# Patient Record
Sex: MALE | Race: WHITE | NOT HISPANIC OR LATINO | URBAN - METROPOLITAN AREA
[De-identification: names, ages, dates, MRNs, and addresses within clinical notes are randomized per-mention and may not be internally consistent; named-entity substitution may affect disease eponyms.]

---

## 2020-03-04 ENCOUNTER — EMERGENCY (EMERGENCY)
Facility: HOSPITAL | Age: 30
LOS: 0 days | Discharge: HOME | End: 2020-03-04
Admitting: EMERGENCY MEDICINE
Payer: MEDICAID

## 2020-03-04 VITALS
HEART RATE: 77 BPM | TEMPERATURE: 98 F | SYSTOLIC BLOOD PRESSURE: 136 MMHG | OXYGEN SATURATION: 98 % | RESPIRATION RATE: 17 BRPM | DIASTOLIC BLOOD PRESSURE: 66 MMHG
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
## 2020-03-04 DIAGNOSIS — Z23 ENCOUNTER FOR IMMUNIZATION: ICD-10-CM
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
## 2020-03-04 DIAGNOSIS — S51.811A LACERATION WITHOUT FOREIGN BODY OF RIGHT FOREARM, INITIAL ENCOUNTER: ICD-10-CM

## 2020-03-04 PROCEDURE — 99283 EMERGENCY DEPT VISIT LOW MDM: CPT | Mod: 25

## 2020-03-04 PROCEDURE — 12002 RPR S/N/AX/GEN/TRNK2.6-7.5CM: CPT

## 2020-03-04 RX ORDER — TETANUS TOXOID, REDUCED DIPHTHERIA TOXOID AND ACELLULAR PERTUSSIS VACCINE, ADSORBED 5; 2.5; 8; 8; 2.5 [IU]/.5ML; [IU]/.5ML; UG/.5ML; UG/.5ML; UG/.5ML
0.5 SUSPENSION INTRAMUSCULAR ONCE
Refills: 0 | Status: COMPLETED | OUTPATIENT
Start: 2020-03-04 | End: 2020-03-04

## 2020-03-04 RX ADMIN — TETANUS TOXOID, REDUCED DIPHTHERIA TOXOID AND ACELLULAR PERTUSSIS VACCINE, ADSORBED 0.5 MILLILITER(S): 5; 2.5; 8; 8; 2.5 SUSPENSION INTRAMUSCULAR at 23:33

## 2020-03-04 NOTE — ED PROVIDER NOTE - PATIENT PORTAL LINK FT
You can access the FollowMyHealth Patient Portal offered by [facility] by registering at the following website: [email or web address]. By joining [facility]’s FollowMyHealth portal, you will also be able to view your health information using other applications (apps) compatible with our system.

## 2020-03-04 NOTE — ED PROVIDER NOTE - PHYSICAL EXAMINATION
3cm lac to right forearm, linear superficial CONSTITUTIONAL: Well-appearing; well-nourished; in no apparent distress.   MS:  Normal ROM in all four extremities; non-tender to palpation; distal pulses are normal.   SKIN: 3cm lac to right forearm, linear superficial; otherwise normal for age and race; warm; dry; good turgor; no apparent lesions or exudate.   NEURO/PSYCH: A & O x 4; grossly unremarkable. mood and manner are appropriate. Grooming and personal hygiene are appropriate. No apparent thoughts of harm to self or others.

## 2020-03-04 NOTE — ED PROVIDER NOTE - PROGRESS NOTE DETAILS
Discussed rest, ice, compression, and elevation with patient.   Discussed NSAIDs for anti-inflammatory and pain relief.

## 2020-03-04 NOTE — ED PROVIDER NOTE - NSFOLLOWUPINSTRUCTIONS_ED_ALL_ED_FT
RETURN FOR SUTURE REMOVAL IN 10-14 DAYS  KEEP DRESSING ON AND WOUND CLEAN&DRY FOR 24 HRS.  AFTER THAT, GENTLY REMOVE DRESSING. WASH WITH SOAP&WATER, AVOID SCRUBBING. PAT DRY WELL AND RE-DRESS. REPEAT THIS ONCE OR TWICE DAILY UNTIL THE SUTURES ARE REMOVED.  RETURN IMMEDIATELY IF ANY SIGNS/SYMPTOMS OF INFECTION; INCLUDING BUT NOT LIMITED TO: DISCHARGE, FOUL SMELL, REDNESS, SWELLING, FEVER...    Laceration    WHAT YOU NEED TO KNOW:    A laceration is an injury to the skin and the soft tissue underneath it. Lacerations happen when you are cut or hit by something. They can happen anywhere on the body.     DISCHARGE INSTRUCTIONS:    Return to the emergency department if:     You have heavy bleeding or bleeding that does not stop after 10 minutes of holding firm, direct pressure over the wound.       Your wound opens up.     Contact your healthcare provider if:     You have a fever or chills.       Your laceration is red, warm, or swollen.      You have red streaks on your skin coming from your wound.      You have white or yellow drainage from the wound that smells bad.      You have pain that gets worse, even after treatment.       You have questions or concerns about your condition or care.     Medicines:     Prescription pain medicine may be given. Ask how to take this medicine safely.       Antibiotics help treat or prevent a bacterial infection.       Take your medicine as directed. Contact your healthcare provider if you think your medicine is not helping or if you have side effects. Tell him or her if you are allergic to any medicine. Keep a list of the medicines, vitamins, and herbs you take. Include the amounts, and when and why you take them. Bring the list or the pill bottles to follow-up visits. Carry your medicine list with you in case of an emergency.    Care for your wound as directed:     Do not get your wound wet until your healthcare provider says it is okay. Do not soak your wound in water. Do not go swimming until your healthcare provider says it is okay. Carefully wash the wound with soap and water. Gently pat the area dry or allow it to air dry.       Change your bandages when they get wet, dirty, or after washing. Apply new, clean bandages as directed. Do not apply elastic bandages or tape too tight. Do not put powders or lotions over your incision.       Apply antibiotic ointment as directed. Your healthcare provider may give you antibiotic ointment to put over your wound if you have stitches. If you have strips of tape over your incision, let them dry up and fall off on their own. If they do not fall off within 14 days, gently remove them. If you have glue over your wound, do not remove or pick at it. If your glue comes off, do not replace it with glue that you have at home.       Check your wound every day for signs of infection such as swelling, redness, or pus.     Self-care:     Apply ice on your wound for 15 to 20 minutes every hour or as directed. Use an ice pack, or put crushed ice in a plastic bag. Cover it with a towel. Ice helps prevent tissue damage and decreases swelling and pain.      Use a splint as directed. A splint will decrease movement and stress on your wound. It may help it heal faster. A splint may be used for lacerations over joints or areas of your body that bend. Ask your healthcare provider how to apply and remove a splint.       Decrease scarring of your wound by applying ointments as directed. Do not apply ointments until your healthcare provider says it is okay. You may need to wait until your wound is healed. Ask which ointment to buy and how often to use it. After your wound is healed, use sunscreen over the area when you are out in the sun. You should do this for at least 6 months to 1 year after your injury.     Follow up with your healthcare provider as directed: You may need to follow up in 24 to 48 hours to have your wound checked for infection. You will need to return in 3 to 14 days if you have stitches or staples so they can be removed. Care for your wound as directed to prevent infection and help it heal. Write down your questions so you remember to ask them during your visits.       © Copyright [employer] 2019 All illustrations and images included in CareNotes are the copyrighted property of [employer] or [employer].

## 2020-03-04 NOTE — ED PROVIDER NOTE - PMH
Problem: Patient Care Overview (Adult)  Goal: Plan of Care Review  Outcome: Ongoing (interventions implemented as appropriate)   02/05/18 1222   Coping/Psychosocial Response Interventions   Plan Of Care Reviewed With patient   Patient Care Overview   Progress improving   Outcome Evaluation   Outcome Summary/Follow up Plan pt anxious wants to go home.only needs CG assist for gait.ambulat 250 ft today with only 1 standing rest       Problem: Inpatient Physical Therapy  Goal: Bed Mobility Goal LTG- PT  Outcome: Outcome(s) achieved Date Met: 02/05/18 01/29/18 1420 02/04/18 1530 02/05/18 1222   Bed Mobility PT LTG   Bed Mobility PT LTG, Date Established 01/29/18 --  --    Bed Mobility PT LTG, Time to Achieve 2 wks --  --    Bed Mobility PT LTG, Activity Type supine to sit/sit to supine --  --    Bed Mobility PT LTG, [place] Level independent --  --    Bed Mobility PT LTG, Date Goal Reviewed --  02/04/18 --    Bed Mobility PT LTG, Outcome --  --  goal met     Goal: Transfer Training Goal 1 LTG- PT  Outcome: Ongoing (interventions implemented as appropriate)   01/29/18 1420 02/04/18 1530 02/05/18 1222   Transfer Training PT LTG   Transfer Training PT LTG, Date Established 01/29/18 --  --    Transfer Training PT LTG, Time to Achieve 2 wks --  --    Transfer Training PT LTG, Activity Type sit to stand/stand to sit --  --    Transfer Training PT LTG, [place] Level conditional independence --  --    Transfer Training PT LTG, Assist Device walker, rolling --  --    Transfer Training PT LTG, Date Goal Reviewed --  02/04/18 --    Transfer Training PT LTG, Outcome --  --  goal ongoing          No pertinent past medical history <<----- Click to add NO pertinent Past Medical History

## 2020-03-04 NOTE — ED PROVIDER NOTE - OBJECTIVE STATEMENT
was playing with a metal sticked mop and it accidentally hit his right forearm causing lac, tdap not utd pt was playing with a metal sticked mop and it accidentally hit his right forearm causing lac, tdap not utd. Denies fever/chill/HA/dizziness/chest pain/palpitation/sob/abd pain/n/v/d/ black stool/bloody stool/urinary sxs

## 2020-06-03 ENCOUNTER — EMERGENCY (EMERGENCY)
Facility: HOSPITAL | Age: 30
LOS: 0 days | Discharge: HOME | End: 2020-06-04
Attending: EMERGENCY MEDICINE | Admitting: EMERGENCY MEDICINE
Payer: MEDICAID

## 2020-06-03 VITALS
OXYGEN SATURATION: 96 % | DIASTOLIC BLOOD PRESSURE: 71 MMHG | HEART RATE: 96 BPM | SYSTOLIC BLOOD PRESSURE: 151 MMHG | WEIGHT: 279.99 LBS | RESPIRATION RATE: 16 BRPM | TEMPERATURE: 98 F
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
## 2020-06-03 DIAGNOSIS — M79.646 PAIN IN UNSPECIFIED FINGER(S): ICD-10-CM
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
## 2020-06-03 DIAGNOSIS — S63.101A UNSPECIFIED SUBLUXATION OF RIGHT THUMB, INITIAL ENCOUNTER: ICD-10-CM

## 2020-06-03 PROCEDURE — 99284 EMERGENCY DEPT VISIT MOD MDM: CPT | Mod: 57

## 2020-06-03 PROCEDURE — 73130 X-RAY EXAM OF HAND: CPT | Mod: 26,RT,76

## 2020-06-03 PROCEDURE — 26770 TREAT FINGER DISLOCATION: CPT | Mod: 54

## 2020-06-03 NOTE — ED PROVIDER NOTE - NS ED ROS FT
GEN:  no fever, no chills  NEURO:  no headache, no dizziness  CV:  no chest pain, no palpitations  RESP:  no sob, no cough  MSK:  + thumb pain, no edema  SKIN:  no rash, no bruising

## 2020-06-03 NOTE — ED PROVIDER NOTE - OBJECTIVE STATEMENT
29 yo M with no PMHx who presents with R thumb pain/deformity after accidentally hitting it against a dry wall 1 hr prior to arrival. No numbness, tingling, skin color change, other injury. Has dislocated R thumb in past before from playing baseball. R hand dominant.

## 2020-06-03 NOTE — ED PROVIDER NOTE - ATTENDING CONTRIBUTION TO CARE
30yoM previously healthy presents with R thumb deformity s/p running into drywall. Denies numbness and all other symptoms. On exam, afebrile, hemodynamically stable, saturating well, NAD, well appearing, head NCAT, breathing comfortably on RA, AAO, CN's 3-12 grossly intact, R thumb hyperextended/fixed, skin warm, nml color, well perfused, <2 sec cap refill. Thumb reduced successful. Full ROM and perfusion. Patient is well appearing, NAD, afebrile, hemodynamically stable. Any available tests and studies were discussed with patient. Discharged with instructions in further symptomatic care and need for ortho f/u. 30yoM previously healthy presents with R thumb deformity s/p running into drywall. Denies numbness and all other symptoms. Has had multiple past dislocations that he self-reduced. On exam, afebrile, hemodynamically stable, saturating well, NAD, well appearing, head NCAT, breathing comfortably on RA, AAO, CN's 3-12 grossly intact, R thumb hyperextended/fixed, skin warm, nml color, well perfused, <2 sec cap refill. Thumb reduced successful. Full ROM and perfusion. Patient is well appearing, NAD, afebrile, hemodynamically stable. Any available tests and studies were discussed with patient. Discharged with instructions in further symptomatic care and need for ortho f/u.

## 2020-06-03 NOTE — ED PROVIDER NOTE - NSFOLLOWUPINSTRUCTIONS_ED_ALL_ED_FT
Finger Dislocation    WHAT YOU NEED TO KNOW:    A finger dislocation happen when bones in your finger move out of their normal position.    DISCHARGE INSTRUCTIONS:    Return to the emergency department if:     You have increased swelling under your splint or cast.      You think your cast or splint is too tight.      You cannot move your fingers.    Call your doctor or hand specialist if:     You have numbness or tingling in your hand.      The skin under your cast or splint burns or stings.      The skin around your cast becomes red or raw.      Your cast becomes cracked or damaged.      You have questions or concerns about your condition or care.    Medicines: You may need any of the following:     Prescription pain medicine may be given. Ask your healthcare provider how to take this medicine safely. Some prescription pain medicines contain acetaminophen. Do not take other medicines that contain acetaminophen without talking to your healthcare provider. Too much acetaminophen may cause liver damage. Prescription pain medicine may cause constipation. Ask your healthcare provider how to prevent or treat constipation.       Acetaminophen decreases pain and fever. It is available without a doctor's order. Ask how much to take and how often to take it. Follow directions. Read the labels of all other medicines you are using to see if they also contain acetaminophen, or ask your doctor or pharmacist. Acetaminophen can cause liver damage if not taken correctly. Do not use more than 4 grams (4,000 milligrams) total of acetaminophen in one day.       NSAIDs, such as ibuprofen, help decrease swelling, pain, and fever. This medicine is available with or without a doctor's order. NSAIDs can cause stomach bleeding or kidney problems in certain people. If you take blood thinner medicine, always ask if NSAIDs are safe for you. Always read the medicine label and follow directions. Do not give these medicines to children under 6 months of age without direction from your child's healthcare provider.      Take your medicine as directed. Contact your healthcare provider if you think your medicine is not helping or if you have side effects. Tell him or her if you are allergic to any medicine. Keep a list of the medicines, vitamins, and herbs you take. Include the amounts, and when and why you take them. Bring the list or the pill bottles to follow-up visits. Carry your medicine list with you in case of an emergency.    Manage a finger dislocation:     Apply ice to your finger. Apply ice for 15 to 20 minutes every hour or as directed. Use an ice pack, or put crushed ice in a plastic bag. Cover it with a towel before you apply it to your finger. Ice helps prevent tissue damage and decreases swelling and pain.      Elevate your finger above the level of your heart. This can help reduce swelling. Prop your arm or hand on a pillow. This should be done as often as you can for the first 1 to 3 days after your injury.      Exercise your finger, as directed. Exercise can help reduce pain, swelling, and stiffness in your finger. It also can help increase strength and movement. You may need to exercise your finger as soon as you can. You also may be told not to move your finger for a few weeks. Be sure to follow your healthcare provider's instructions.    Care for your splint or cast:     Do not get your splint or cast wet. Use a plastic bag to cover the splint or cast if you shower.      Keep your splint or cast clean. Make sure no dirt gets under your splint or cast.      Do not trim your cast without talking to your healthcare provider. Never remove your cast on your own.    Follow up with your doctor or hand specialist as directed: Write down your questions so you remember to ask them during your follow-up visits.       © Copyright [employer] 2020

## 2020-06-03 NOTE — ED PROVIDER NOTE - PHYSICAL EXAMINATION
CONSTITUTIONAL: well developed, nontoxic appearing, in no acute distress, speaking in full sentences  SKIN: warm, dry, no rash, cap refill < 2 seconds  HEENT: normocephalic, atraumatic, no conjunctival erythema, moist mucous membranes, patent airway  NECK: supple  CV:  regular rate, regular rhythm, 2+ radial pulses bilaterally  RESP: normal work of breathing  MSK: R thumb deformity held in flexion, normal sensation in finger tips, normal cap refill, normal ROM in R 2nd-5th fingers  NEURO: alert, oriented, grossly unremarkable  PSYCH: cooperative, appropriate

## 2020-06-03 NOTE — ED PROVIDER NOTE - PROGRESS NOTE DETAILS
TC: Previously healthy 29 yo M who presents with R thumb dislocation, hx of prior R thumb dislocations. Here in ED, ROM limited 2/2 pain but neurovascularly intact. Initial xray without fx. Successfully reduced with traction/counter traction, confirmed by postreduction xray. Given hand f/u. Strict ED return precautions given. Pt verbalized understanding and was agreeable with plan.

## 2020-06-03 NOTE — ED PROVIDER NOTE - CLINICAL SUMMARY MEDICAL DECISION MAKING FREE TEXT BOX
30yoM previously healthy presents with R thumb deformity s/p running into drywall. Denies numbness and all other symptoms. On exam, afebrile, hemodynamically stable, saturating well, NAD, well appearing, head NCAT, breathing comfortably on RA, AAO, CN's 3-12 grossly intact, R thumb hyperextended/fixed, skin warm, nml color, well perfused, <2 sec cap refill. Thumb reduced successful. Full ROM and perfusion. Patient is well appearing, NAD, afebrile, hemodynamically stable. Any available tests and studies were discussed with patient. Discharged with instructions in further symptomatic care and need for ortho f/u.

## 2020-06-03 NOTE — ED PROVIDER NOTE - CARE PROVIDER_API CALL
Ramirez Dang  Orthopaedic Surgery  [address] [place] [address]  Phone: [phone]  Fax: [phone]  Follow Up Time:

## 2020-06-03 NOTE — ED PROVIDER NOTE - PATIENT PORTAL LINK FT
You can access the FollowMyHealth Patient Portal offered by [facility] by registering at the following website: [email or web address]. By joining [facility]’s FollowMyHealth portal, you will also be able to view your health information using other applications (apps) compatible with our system.

## 2020-06-04 PROBLEM — Z78.9 OTHER SPECIFIED HEALTH STATUS: Chronic | Status: ACTIVE | Noted: 2020-03-05

## 2020-08-04 NOTE — ED ADULT NURSE NOTE - CAS EDN DISCHARGE ASSESSMENT
CHIEF COMPLAINT  Chief Complaint   Patient presents with   • Sent by MD     6/26/20 pt had foot surgery. pt now stats MD on vacation and cant get into office. pt states pus weeping from wound, fevers and pain.        HPI  Cayetano Hawkins is a 45 y.o. male who presents with foot swelling that seems to be worsening.  The patient had osteophytic surgery on his right foot June 26-since that time he has had some swelling in the area and was told he had a seroma.  He notes subjective fevers as well as increased pain and then the discharge from the wound recently.  He denies any history of vomiting or diarrhea.  No body aches.  He does have a history of diabetes.    REVIEW OF SYSTEMS  Positive subjective fever, no body aches, no vomiting    PAST MEDICAL HISTORY  Past Medical History:   Diagnosis Date   • Arthritis right hip    osteo left hip, right hand   • Backpain     feet/hip-R,back   • Dental disorder 06/2020    upper dentures   • Diabetes     oral meds   • Gastroparesis     possible-recent gastric testing   • Hepatitis C 2009    No tx   • Hepatitis C carrier (HCC)    • High cholesterol    • Hyperlipidemia    • Hypertension    • Infectious disease    • Leukocytosis 11/25/2019       FAMILY HISTORY  Family History   Problem Relation Age of Onset   • Diabetes Mother    • Hyperlipidemia Mother    • Arthritis Mother    • Heart Attack Father    • Heart Disease Father    • Hypertension Father    • Stroke Father    • Hyperlipidemia Father    • Arthritis Father        SOCIAL HISTORY  Social History     Socioeconomic History   • Marital status:      Spouse name: Not on file   • Number of children: Not on file   • Years of education: Not on file   • Highest education level: Not on file   Occupational History   • Not on file   Social Needs   • Financial resource strain: Not on file   • Food insecurity     Worry: Not on file     Inability: Not on file   • Transportation needs     Medical: Not on file     Non-medical:  Not on file   Tobacco Use   • Smoking status: Former Smoker     Packs/day: 0.50     Years: 10.00     Pack years: 5.00     Last attempt to quit: 2012     Years since quittin.5   • Smokeless tobacco: Never Used   • Tobacco comment: 5 yrs ago   Substance and Sexual Activity   • Alcohol use: No   • Drug use: Yes     Types: Marijuana, Inhaled     Comment: marijuana inhales weekly   • Sexual activity: Yes     Partners: Female   Lifestyle   • Physical activity     Days per week: Not on file     Minutes per session: Not on file   • Stress: Not on file   Relationships   • Social connections     Talks on phone: Not on file     Gets together: Not on file     Attends [employer] service: Not on file     Active member of club or organization: Not on file     Attends meetings of clubs or organizations: Not on file     Relationship status: Not on file   • Intimate partner violence     Fear of current or ex partner: Not on file     Emotionally abused: Not on file     Physically abused: Not on file     Forced sexual activity: Not on file   Other Topics Concern   • Not on file   Social History Narrative   • Not on file       SURGICAL HISTORY  Past Surgical History:   Procedure Laterality Date   • OSTECTOMY Right 2020    Procedure: EXCISION, BONE - OSTEPHYTIC BONE, FOOT;  Surgeon: Willie Kurtz D.P.M.;  Location: [facility];  Service: Podiatry   • TOE AMPUTATION Right 2018    Procedure: TOE AMPUTATION - 5TH RAY REVISION;  Surgeon: Abram Cortez M.D.;  Location: [facility];  Service: Orthopedics   • IRRIGATION & DEBRIDEMENT ORTHO Right 2016    Procedure: IRRIGATION & DEBRIDEMENT AND CLOSURE OF ORTHO FOOT WOUND;  Surgeon: Hitesh Sol M.D.;  Location: [facility];  Service:    • IRRIGATION & DEBRIDEMENT ORTHO Right 4/10/2016    Procedure: IRRIGATION & DEBRIDEMENT ORTHO-poss ray resection, poss below knee amputation ;  Surgeon: Hitesh Sol M.D.;   "Location: [facility];  Service:    • IRRIGATION & DEBRIDEMENT ORTHO Right 4/4/2016    Procedure: IRRIGATION & DEBRIDEMENT ORTHO FOOT - AMPUTATION RIGHT FIFTH TOE ;  Surgeon: Hitesh Sol M.D.;  Location: [facility];  Service:    • CHOLECYSTECTOMY  2011   • APPENDECTOMY  1988   • OSWALDO BY LAPAROSCOPY     • OTHER  hepatitis c        CURRENT MEDICATIONS  Home Medications    **Home medications have not yet been reviewed for this encounter**         ALLERGIES  Allergies   Allergen Reactions   • Bee Swelling     Bee stings       PHYSICAL EXAM  VITAL SIGNS: /91   Pulse 77   Temp 37.2 °C (98.9 °F) (Oral)   Resp 16   Ht 1.803 m (5' 11\")   Wt 86.2 kg (190 lb)   SpO2 97%   BMI 26.50 kg/m²      Constitutional: Well developed, Well nourished, No acute distress, Non-toxic appearance.   HENT: Normocephalic, Atraumatic  Cardiovascular: Regular pulse  Lungs: No respiratory distress  Skin: Warm, Dry, no rash  Extremities: There is soft tissue swelling mostly laterally on the right foot with some mild warmth and erythema and there is purulent drainage at a central point.  Pulses are 2+, motor or sensory intact  Neurologic: Alert, appropriate, follows commands  Psychiatric: Affect normal    RADIOLOGY/PROCEDURES  DX-FOOT-COMPLETE 3+ RIGHT   Final Result      1.  Possible erosive change of the medial navicular. Differential diagnosis includes erosive arthritis or septic arthritis especially if there is clinical evidence for infection or an overlying open wound      2.  Subacute, fracture of the base of 4th metatarsal      3.  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        Results for orders placed or performed during the hospital encounter of 08/03/20   CBC WITH DIFFERENTIAL   Result Value Ref Range    WBC 10.9 (H) 4.8 - 10.8 K/uL    RBC 4.20 (L) 4.70 - 6.10 M/uL    Hemoglobin 11.8 (L) 14.0 - 18.0 g/dL    Hematocrit 37.3 (L) 42.0 - 52.0 %    MCV 88.8 81.4 - 97.8 fL    MCH 28.1 27.0 " - 33.0 pg    MCHC 31.6 (L) 33.7 - 35.3 g/dL    RDW 41.5 35.9 - 50.0 fL    Platelet Count 473 (H) 164 - 446 K/uL    MPV 8.6 (L) 9.0 - 12.9 fL    Neutrophils-Polys 69.90 44.00 - 72.00 %    Lymphocytes 23.50 22.00 - 41.00 %    Monocytes 4.90 0.00 - 13.40 %    Eosinophils 0.80 0.00 - 6.90 %    Basophils 0.40 0.00 - 1.80 %    Immature Granulocytes 0.50 0.00 - 0.90 %    Nucleated RBC 0.00 /100 WBC    Neutrophils (Absolute) 7.64 (H) 1.82 - 7.42 K/uL    Lymphs (Absolute) 2.57 1.00 - 4.80 K/uL    Monos (Absolute) 0.53 0.00 - 0.85 K/uL    Eos (Absolute) 0.09 0.00 - 0.51 K/uL    Baso (Absolute) 0.04 0.00 - 0.12 K/uL    Immature Granulocytes (abs) 0.05 0.00 - 0.11 K/uL    NRBC (Absolute) 0.00 K/uL   COMP METABOLIC PANEL   Result Value Ref Range    Sodium 139 135 - 145 mmol/L    Potassium 4.6 3.6 - 5.5 mmol/L    Chloride 100 96 - 112 mmol/L    Co2 23 20 - 33 mmol/L    Anion Gap 16.0 7.0 - 16.0    Glucose 138 (H) 65 - 99 mg/dL    Bun 20 8 - 22 mg/dL    Creatinine 0.96 0.50 - 1.40 mg/dL    Calcium 9.5 8.5 - 10.5 mg/dL    AST(SGOT) 34 12 - 45 U/L    ALT(SGPT) 59 (H) 2 - 50 U/L    Alkaline Phosphatase 129 (H) 30 - 99 U/L    Total Bilirubin 0.3 0.1 - 1.5 mg/dL    Albumin 3.8 3.2 - 4.9 g/dL    Total Protein 8.1 6.0 - 8.2 g/dL    Globulin 4.3 (H) 1.9 - 3.5 g/dL    A-G Ratio 0.9 g/dL   LACTIC ACID   Result Value Ref Range    Lactic Acid 1.8 0.5 - 2.0 mmol/L   ESTIMATED GFR   Result Value Ref Range    GFR If African American >60 >60 mL/min/1.73 m 2    GFR If Non African American >60 >60 mL/min/1.73 m 2         COURSE & MEDICAL DECISION MAKING  Pertinent Labs & Imaging studies reviewed. (See chart for details)  This is a 45-year-old male with a history of diabetes who presents status post surgery from June 26 by Dr. Kurtz.  He has had increased swelling and pain as well as discharge from his foot and has evidence of a foot abscess/cellulitis.  The patient was written for vancomycin and Unasyn here.  Cultures were obtained.  Case  discussed with his surgeon who is requesting transfer to [facility] where he operates.  Patient will be admitted on IV antibiotics and then seen by surgery.    FINAL IMPRESSION  1. Diabetic foot cellulitis, abscess  2.   3.         Electronically signed by: Rajiv Godinez M.D., 8/3/2020 6:36 PM         Alert and oriented to person, place and time

## 2021-02-20 ENCOUNTER — EMERGENCY (EMERGENCY)
Facility: HOSPITAL | Age: 31
LOS: 0 days | Discharge: HOME | End: 2021-02-20
Attending: STUDENT IN AN ORGANIZED HEALTH CARE EDUCATION/TRAINING PROGRAM | Admitting: STUDENT IN AN ORGANIZED HEALTH CARE EDUCATION/TRAINING PROGRAM
Payer: MEDICAID

## 2021-02-20 VITALS
TEMPERATURE: 98 F | HEART RATE: 103 BPM | WEIGHT: 274.92 LBS | DIASTOLIC BLOOD PRESSURE: 79 MMHG | OXYGEN SATURATION: 97 % | RESPIRATION RATE: 20 BRPM | SYSTOLIC BLOOD PRESSURE: 118 MMHG
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
## 2021-02-20 DIAGNOSIS — M79.646 PAIN IN UNSPECIFIED FINGER(S): ICD-10-CM
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
## 2021-02-20 DIAGNOSIS — S62.502A FRACTURE OF UNSPECIFIED PHALANX OF LEFT THUMB, INITIAL ENCOUNTER FOR CLOSED FRACTURE: ICD-10-CM

## 2021-02-20 PROCEDURE — 73130 X-RAY EXAM OF HAND: CPT | Mod: 26,LT

## 2021-02-20 PROCEDURE — 29130 APPL FINGER SPLINT STATIC: CPT

## 2021-02-20 PROCEDURE — 99284 EMERGENCY DEPT VISIT MOD MDM: CPT | Mod: 25

## 2021-02-20 NOTE — ED PROVIDER NOTE - ATTENDING CONTRIBUTION TO CARE
29 yo m presents w/ L thumb pain. pt was frustrated after losing a video game match and hit an object with his L hand. pt states he then felt pain to base of thumb. no other injury. FROM to wrist and no pain on moving wrist.  no elbow or shoulder complaint.    vss  gen- NAD, aaox3  card-rrr  lungs-ctab, no wheezing or rhonchi  L hand- mild ttp to base of thumb, FROM to thumb but some pain w/ ranging, cap refil<2 sec, sensaton intact, no wrist ttp, FROM to wrist    xr to r/o fx, splint, RICE

## 2021-02-20 NOTE — ED PROVIDER NOTE - PHYSICAL EXAMINATION
CONSTITUTIONAL: Well-developed; well-nourished; in no acute distress, nontoxic appearing  SKIN: skin exam is warm and dry,  HEAD: Normocephalic; atraumatic.  EXT: +TTP overlying L 1st digit overlying MCP joint, no overlying skin changes, no deformity  NEURO: awake, alert, following commands, oriented, grossly unremarkable. No Focal deficits. GCS 15.   PSYCH: Cooperative, appropriate.

## 2021-02-20 NOTE — ED PROVIDER NOTE - PATIENT PORTAL LINK FT
You can access the FollowMyHealth Patient Portal offered by  by registering at the following website: [email or web address]. By joining [employer]’s FollowMyHealth portal, you will also be able to view your health information using other applications (apps) compatible with our system.

## 2021-02-20 NOTE — ED PROVIDER NOTE - NSFOLLOWUPINSTRUCTIONS_ED_ALL_ED_FT
Please follow up with your primary care doctor and orthopedics in 1-3 days   Please be aware of any new or worsening signs or symptoms that should prompt your return to the ER.      Finger Fracture    WHAT YOU NEED TO KNOW:    A finger fracture is a break in 1 or more of the bones in your finger.     DISCHARGE INSTRUCTIONS:    Return to the emergency department if:     Your cast or splint gets wet, damaged, or comes off.      Your splint or cast feels too tight.      You have severe pain.      Your injured finger is numb, cold, or pale.    Contact your healthcare provider or hand specialist if:     Your pain or swelling gets worse, even after treatment.      You have questions or concerns about your condition or care.    Medicines:     NSAIDs, such as ibuprofen, help decrease swelling, pain, and fever. This medicine is available with or without a doctor's order. NSAIDs can cause stomach bleeding or kidney problems in certain people. If you take blood thinner medicine, always ask your healthcare provider if NSAIDs are safe for you. Always read the medicine label and follow directions.      Acetaminophen decreases pain and fever. It is available without a doctor's order. Ask how much to take and how often to take it. Follow directions. Acetaminophen can cause liver damage if not taken correctly.      Prescription pain medicine may be given. Ask your healthcare provider how to take this medicine safely. Some prescription pain medicines contain acetaminophen. Do not take other medicines that contain acetaminophen without talking to your healthcare provider. Too much acetaminophen may cause liver damage. Prescription pain medicine may cause constipation. Ask your healthcare provider how to prevent or treat constipation.       Take your medicine as directed. Contact your healthcare provider if you think your medicine is not helping or if you have side effects. Tell him or her if you are allergic to any medicine. Keep a list of the medicines, vitamins, and herbs you take. Include the amounts, and when and why you take them. Bring the list or the pill bottles to follow-up visits. Carry your medicine list with you in case of an emergency.    Self-care:     Wear your splint as directed. Do not remove your splint until you follow up with your healthcare provider or hand specialist.       Apply ice on your finger for 15 to 20 minutes every hour or as directed. Use an ice pack, or put crushed ice in a plastic bag. Cover it with a towel before you apply it to your skin. Ice helps prevent tissue damage and decreases swelling and pain.      Elevate your finger above the level of your heart as often as you can. This will help decrease swelling and pain. Prop your hand on pillows or blankets to keep it elevated comfortably.       Go to physical therapy as directed. A physical therapist teaches you exercises to help improve movement and strength, and to decrease pain.     Follow up with your healthcare provider or hand specialist within 2 days: Write down your questions so you remember to ask them during your visits.        © Copyright [employer] 2019 All illustrations and images included in CareNotes are the copyrighted property of [employer] or [employer].

## 2021-02-20 NOTE — ED PROVIDER NOTE - OBJECTIVE STATEMENT
30 year old male, no past medical history, who presents with L thumb injury. patient states he slammed hand against wall, resulting in sudden onset of L thumb pain. pain exacerbated w/ movement. no f/c, numbness, weakness, paresthesias, wrist pain, elbow pain.

## 2021-02-20 NOTE — ED PROVIDER NOTE - NSFOLLOWUPCLINICS_GEN_ALL_ED_FT
[facility]  Orthpedic  [address], [place]   Phone: [phone]  Fax:   Follow Up Time: 1-3 Days

## 2021-02-20 NOTE — ED PROVIDER NOTE - CARE PROVIDER_API CALL
Ramirez Dang)  Orthopaedic Surgery  [address] [place] [address]  Phone: [phone]  Fax: [phone]  Follow Up Time: 1-3 Days

## 2021-02-20 NOTE — ED PROVIDER NOTE - NS ED ROS FT
Review of Systems:  	•	CONSTITUTIONAL: no fever, no diaphoresis, no chills  	•	SKIN: no rash  	•	HEMATOLOGIC: no bleeding, no bruising  	•	MUSCULOSKELETAL: +L thumb pain, no swelling/redness  	•	NEUROLOGIC: no weakness, no paresthesias

## 2021-09-21 NOTE — ED ADULT NURSE NOTE - NS ED NOTE ABUSE SUSPICION NEGLECT YN
Received report from Charge RN, pt A&Ox4. VSS, slightly tachy at 106. On RA. Pt on Capnography stating pain 9/10 and requesting IV Dilaudid \"It hurts too much to swallow pills\". This writer educated pt that medication could be crushed and given with fluids to dilute. Pt agreed, but after 1x order for Norco was received, pt refused and asked to see Pain Svc MD. Four eyes skin assessment done with Dahiana RAMIREZ. IVF started on PIV. DTV, mother at bedside, call light within reach. Will continue to monitor.    No

## 2022-01-13 NOTE — ED ADULT NURSE NOTE - NS ED NURSE DC INFO COMPLEXITY
1 Principal Discharge DX:	Aggressive behavior   Simple: Patient demonstrates quick and easy understanding/Straightforward: Basic instructions, no meds, no home treatment/Verbalized Understanding

## 2022-08-21 NOTE — ED ADULT NURSE NOTE - PMH
Laura Mendez(Attending) No pertinent past medical history <<----- Click to add NO pertinent Past Medical History

## 2022-12-07 NOTE — ED PROVIDER NOTE - NS ED ATTENDING STATEMENT MOD
Attending with Assistance with ambulation/Assistance OOB with selected safe patient handling equipment/Communicate Risk of Fall with Harm to all staff/Discuss with provider need for PT consult/Monitor gait and stability/Reinforce activity limits and safety measures with patient and family/Tailored Fall Risk Interventions/Visual Cue: Yellow wristband and red socks/Bed in lowest position, wheels locked, appropriate side rails in place/Call bell, personal items and telephone in reach/Instruct patient to call for assistance before getting out of bed or chair/Non-slip footwear when patient is out of bed/[place] to call system/Physically safe environment - no spills, clutter or unnecessary equipment/Purposeful Proactive Rounding/Room/bathroom lighting operational, light cord in reach

## 2025-01-28 NOTE — ED PROVIDER NOTE - TOBACCO USE
Called patient in reference to her message. Patients script was discontinued and refilled per [facility] sent a refill request however, patient didn't need one as her script is good for the rest of the year. Patient said okay thanks for calling and explaining.   Never smoker